# Patient Record
Sex: MALE | Race: WHITE | NOT HISPANIC OR LATINO | Employment: FULL TIME | URBAN - METROPOLITAN AREA
[De-identification: names, ages, dates, MRNs, and addresses within clinical notes are randomized per-mention and may not be internally consistent; named-entity substitution may affect disease eponyms.]

---

## 2017-03-23 DIAGNOSIS — E11.9 TYPE 2 DIABETES MELLITUS WITHOUT COMPLICATION: ICD-10-CM

## 2017-03-23 DIAGNOSIS — I10 UNSPECIFIED ESSENTIAL HYPERTENSION: Primary | ICD-10-CM

## 2017-03-23 RX ORDER — SAXAGLIPTIN 5 MG/1
TABLET, FILM COATED ORAL
Qty: 30 TABLET | Refills: 6 | Status: SHIPPED | OUTPATIENT
Start: 2017-03-23

## 2017-03-23 RX ORDER — LISINOPRIL 40 MG/1
TABLET ORAL
Qty: 30 TABLET | Refills: 6 | Status: SHIPPED | OUTPATIENT
Start: 2017-03-23

## 2017-03-23 RX ORDER — METFORMIN HYDROCHLORIDE 1000 MG/1
TABLET ORAL
Qty: 120 TABLET | Refills: 6 | Status: SHIPPED | OUTPATIENT
Start: 2017-03-23

## 2017-03-23 RX ORDER — GLIPIZIDE 10 MG/1
TABLET ORAL
Qty: 120 TABLET | Refills: 6 | Status: SHIPPED | OUTPATIENT
Start: 2017-03-23

## 2017-03-23 RX ORDER — CITALOPRAM 40 MG/1
TABLET, FILM COATED ORAL
Qty: 30 TABLET | Refills: 6 | Status: SHIPPED | OUTPATIENT
Start: 2017-03-23

## 2017-03-27 ENCOUNTER — PATIENT MESSAGE (OUTPATIENT)
Dept: PULMONOLOGY | Facility: CLINIC | Age: 57
End: 2017-03-27

## 2017-03-27 RX ORDER — FLUTICASONE PROPIONATE 50 MCG
2 SPRAY, SUSPENSION (ML) NASAL DAILY
Qty: 16 G | Refills: 6 | Status: SHIPPED | OUTPATIENT
Start: 2017-03-27 | End: 2017-04-26

## 2017-03-27 RX ORDER — FLUTICASONE PROPIONATE 44 UG/1
2 AEROSOL, METERED RESPIRATORY (INHALATION) 2 TIMES DAILY
Qty: 10.6 G | Refills: 3 | Status: SHIPPED | OUTPATIENT
Start: 2017-03-27 | End: 2018-03-27

## 2017-03-27 RX ORDER — ALBUTEROL SULFATE 90 UG/1
1-2 AEROSOL, METERED RESPIRATORY (INHALATION) EVERY 6 HOURS PRN
Qty: 18 G | Refills: 3 | Status: SHIPPED | OUTPATIENT
Start: 2017-03-27 | End: 2018-03-27

## 2017-08-09 ENCOUNTER — TELEPHONE (OUTPATIENT)
Dept: PULMONOLOGY | Facility: CLINIC | Age: 57
End: 2017-08-09

## 2017-08-09 NOTE — TELEPHONE ENCOUNTER
Called all numbers in chart. Unable to reach at any number. Some are d/c'd or he is no longer at the work number listed.

## 2017-10-25 DIAGNOSIS — E11.9 TYPE 2 DIABETES MELLITUS WITHOUT COMPLICATION: ICD-10-CM

## 2017-10-26 RX ORDER — GLIPIZIDE 10 MG/1
TABLET ORAL
Qty: 120 TABLET | Refills: 5 | OUTPATIENT
Start: 2017-10-26

## 2017-10-26 RX ORDER — LISINOPRIL 40 MG/1
TABLET ORAL
Qty: 30 TABLET | Refills: 5 | OUTPATIENT
Start: 2017-10-26

## 2017-10-26 RX ORDER — CITALOPRAM 40 MG/1
TABLET, FILM COATED ORAL
Qty: 30 TABLET | Refills: 5 | OUTPATIENT
Start: 2017-10-26

## 2017-10-29 DIAGNOSIS — E11.9 TYPE 2 DIABETES MELLITUS WITHOUT COMPLICATION: ICD-10-CM

## 2017-10-30 RX ORDER — CITALOPRAM 40 MG/1
TABLET, FILM COATED ORAL
Qty: 30 TABLET | Refills: 5 | OUTPATIENT
Start: 2017-10-30

## 2017-10-30 RX ORDER — LISINOPRIL 40 MG/1
TABLET ORAL
Qty: 30 TABLET | Refills: 5 | OUTPATIENT
Start: 2017-10-30

## 2017-10-30 RX ORDER — GLIPIZIDE 10 MG/1
TABLET ORAL
Qty: 120 TABLET | Refills: 5 | OUTPATIENT
Start: 2017-10-30

## 2017-12-13 ENCOUNTER — CLINICAL SUPPORT (OUTPATIENT)
Dept: REHABILITATION | Facility: HOSPITAL | Age: 57
End: 2017-12-13
Payer: MEDICAID

## 2017-12-13 DIAGNOSIS — M25.611 DECREASED ROM OF RIGHT SHOULDER: ICD-10-CM

## 2017-12-13 DIAGNOSIS — G89.29 CHRONIC RIGHT SHOULDER PAIN: ICD-10-CM

## 2017-12-13 DIAGNOSIS — M25.511 CHRONIC RIGHT SHOULDER PAIN: ICD-10-CM

## 2017-12-13 PROCEDURE — 97110 THERAPEUTIC EXERCISES: CPT | Mod: PO

## 2017-12-13 PROCEDURE — 97165 OT EVAL LOW COMPLEX 30 MIN: CPT | Mod: PO

## 2017-12-13 NOTE — PATIENT INSTRUCTIONS
Pendulum Circular        Bend forward 90° at waist, leaning on table for support. Rock body in a circular pattern to move arm clockwise 15 times then counterclockwise 15 times.  Do 2 sessions per day.    Copyright © [facility]. All rights reserved.   Pendulum Forward/Back        Bend forward 90º at waist, using table for support. Rock body forward and back to swing arm.  Repeat _15_ times. Do _2_ sessions per day.    Copyright © [facility]. All rights reserved.   Pendulum Side to Side        Bend forward 90º at waist, leaning on table for support. Rock body from side to side and let arm swing freely.  Repeat _15_ times. Do _2_ sessions per day.    Copyright © I. All rights reserved.   Elevation (Active)        Shrug shoulders up, breathing in. Relax, breathing out.  Repeat _15_ times. Do _2_ sessions per day.    Copyright © I. All rights reserved.     Scapular Retraction: Elbow Flexion (Standing)        With elbows bent to 90°, pinch shoulder blades together and rotate arms out, keeping elbows bent.  Repeat _10_ times per set. Do _1_ sets per session. Do _2_ sessions per day.     https://orth.varinode.us/948     Copyright © I. All rights reserved.
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
## 2017-12-19 ENCOUNTER — CLINICAL SUPPORT (OUTPATIENT)
Dept: REHABILITATION | Facility: HOSPITAL | Age: 57
End: 2017-12-19
Payer: MEDICAID

## 2017-12-19 DIAGNOSIS — M25.511 CHRONIC RIGHT SHOULDER PAIN: ICD-10-CM

## 2017-12-19 DIAGNOSIS — M25.611 DECREASED ROM OF RIGHT SHOULDER: ICD-10-CM

## 2017-12-19 DIAGNOSIS — G89.29 CHRONIC RIGHT SHOULDER PAIN: ICD-10-CM

## 2017-12-19 PROCEDURE — 97530 THERAPEUTIC ACTIVITIES: CPT | Mod: PO

## 2017-12-19 NOTE — PROGRESS NOTES
"TIME RECORD    Date: 12/19/2017      Start Time:  8:05  Stop Time:  8:55  Visit: 2    PROCEDURES:    TIMED  Procedure Min.   TA 35                     UNTIMED  Procedure Min.   MHP 5   IFC 10     Total Timed Minutes:  35  Total Timed Units:  2  Total Untimed Units:  1  Charges Billed/# of units:  2TA      Progress/Current Status    Subjective:     Patient ID: Glen Ordaz is a 57 y.o. male.  Diagnosis:   1. Chronic right shoulder pain     2. Decreased ROM of right shoulder       Pain: 4 /10  Pt states "I tried to do some exercise but some of them were hurting me."    Objective:     Pt seen by OT this session. Treatment consisted of the following:    Patient received MH x 5 min to R shoulder to increase blood flow, circulation and tissue elasticity prior to therex   Date: 12/19/17    Visit: 2   Pendulums  10 reps each   Shoulder rotations 10 reps   Scapular retractions 10 reps   Pulleys  15 reps, with 5 sec hold   Table slides 10 reps each, front/back, side/side   Shoulder isometrics, as tolerated 5 reps each with 5 sec hold (5 ways, not ER today)             Patient receives IFC electrical stimulation for pain control applied to R shoulder,   frequency = 13,  @ 100% scan,  for 10 minutes with cold pack         Assessment:     Pt tolerated treatment fairly today. Pt with good participation  And motivation, but cont to c/o high pain with R shoulder movement.  Pt c/o increased pain with table slides at end range and with some isometrics, especially with IR.  Ex performed as tolerated. Pt cont to present with R shld pain, UE weakness, decreased ROM, and limited functional use of R UE.  Pt would cont to benefit from skilled OT services to improve deficits for maximized functional use of R UE.    Patient Education/Response:     Cont HEP and modalities as tolerated    Plans and Goals:     Cont OT poc 2x/week for 8 weeks during certification period 12/13/17 to 2/13/18 in pursuit of established goals    Goals to be met " in 4 weeks: (1/13/18)  1) Initiate Hep   2) Pt will increase R shoulder AROM by 10 degrees grossly for improved performance with overhead ADL's  3) Pt will report 4/10 pain in (R)shoulder at worst  4) Pt will demonstrate increased MMT by at least 1/2 grade for increased functional use of the R UE     Goals to be met by discharge:  1) Independent with HEP  2) Pt will demonstrate (R) shoulder AROM WFL grossly for [place] with ADL's  3) Pt will demonstrate (R) shoulder MMT WFL grossly for [place] with functional activities  4) Independent and pain free with ADL's and IADL's  5) Patient will be able to achieve less than or equal to 40% on FOTO shoulder survey demonstrating overall improved functional ability with upper extremity.      RENALDO Hammond

## 2017-12-21 ENCOUNTER — CLINICAL SUPPORT (OUTPATIENT)
Dept: REHABILITATION | Facility: HOSPITAL | Age: 57
End: 2017-12-21
Payer: MEDICAID

## 2017-12-21 DIAGNOSIS — M25.511 CHRONIC RIGHT SHOULDER PAIN: ICD-10-CM

## 2017-12-21 DIAGNOSIS — G89.29 CHRONIC RIGHT SHOULDER PAIN: ICD-10-CM

## 2017-12-21 DIAGNOSIS — M25.611 DECREASED ROM OF RIGHT SHOULDER: ICD-10-CM

## 2017-12-21 PROCEDURE — 97530 THERAPEUTIC ACTIVITIES: CPT | Mod: PO

## 2017-12-21 NOTE — PROGRESS NOTES
"TIME RECORD    Date: 12/21/2017      Start Time:  8:05  Stop Time:  9:00  Visit: 3    PROCEDURES:    TIMED  Procedure Min.   TA 45   IFC during portion of TA (20)                 UNTIMED  Procedure Min.   MHP 5   CP 5     Total Timed Minutes:  45  Total Timed Units:  3  Total Untimed Units:  0  Charges Billed/# of units:  3TA      Progress/Current Status    Subjective:     Patient ID: Glen Ordaz is a 57 y.o. male.  Diagnosis:   1. Chronic right shoulder pain     2. Decreased ROM of right shoulder       Pain: 4 /10  Pt states "I have some days when I feel ok, and then other days I can tell I did something to bother it."    Objective:     Pt cont to appear guarded with R shld, but is willing to work with it  In therapy.    Pt seen by OT this session. Treatment consisted of the following:    Patient received MH x 5 min to R shoulder to increase blood flow, circulation and tissue elasticity prior to therex  Patient receives IFC electrical stimulation for pain control applied to R shoulder,   frequency = 18,  @ 100% scan,  for 20 min during ex and manual therapy     Date: 12/21/17    Visit: 3   Pendulums  10 reps each   Shoulder rotations 10 reps   Scapular retractions 10 reps   Pulleys  3 min   Table slides 10 reps each, front/back, side/side (not today)   Shoulder isometrics, as tolerated 5 reps each with 5 sec hold (not today)   PROM to R shld as tolerated 10 reps each (with IFC)   Chest press with dowel, 0# 5 reps (with IFC)   Prone scapular retraction 8 reps (with IFC)   Prone ext 5 reps (with IFC)              Patient received cold pack x 5 minutes to decrease pain/inflammation and edema following treatment session.       Assessment:     Pt tolerated treatment fairly well today. Pt with good participation  And motivation, but cont to c/o high pain with R shoulder movement.  Improved tolerance of treatment today, especially with IFC.  Pt cont to c/o pain with FF and ABD.  Ex performed as tolerated. Pt cont to " present with R shld pain, UE weakness, decreased ROM, and limited functional use of R UE.  Pt progressing fairly towards goals. Pt would cont to benefit from skilled OT services to improve deficits for maximized functional use of R UE.    Patient Education/Response:     Cont HEP and modalities as tolerated    Plans and Goals:     Cont OT poc 2x/week for 8 weeks during certification period 12/13/17 to 2/13/18 in pursuit of established goals    Goals to be met in 4 weeks: (1/13/18)  1) Initiate Hep   2) Pt will increase R shoulder AROM by 10 degrees grossly for improved performance with overhead ADL's  3) Pt will report 4/10 pain in (R)shoulder at worst  4) Pt will demonstrate increased MMT by at least 1/2 grade for increased functional use of the R UE     Goals to be met by discharge:  1) Independent with HEP  2) Pt will demonstrate (R) shoulder AROM WFL grossly for [place] with ADL's  3) Pt will demonstrate (R) shoulder MMT WFL grossly for [place] with functional activities  4) Independent and pain free with ADL's and IADL's  5) Patient will be able to achieve less than or equal to 40% on FOTO shoulder survey demonstrating overall improved functional ability with upper extremity.      RENALDO Hammond

## 2025-03-07 ENCOUNTER — TELEPHONE (OUTPATIENT)
Dept: FAMILY MEDICINE | Facility: CLINIC | Age: 65
End: 2025-03-07
Payer: MEDICARE
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
## 2025-03-21 ENCOUNTER — TELEPHONE (OUTPATIENT)
Dept: FAMILY MEDICINE | Facility: CLINIC | Age: 65
End: 2025-03-21
Payer: MEDICARE

## 2025-03-31 ENCOUNTER — OFFICE VISIT (OUTPATIENT)
Dept: FAMILY MEDICINE | Facility: CLINIC | Age: 65
End: 2025-03-31
Payer: MEDICARE

## 2025-03-31 VITALS
RESPIRATION RATE: 18 BRPM | HEART RATE: 89 BPM | OXYGEN SATURATION: 98 % | TEMPERATURE: 98 F | SYSTOLIC BLOOD PRESSURE: 139 MMHG | DIASTOLIC BLOOD PRESSURE: 85 MMHG | BODY MASS INDEX: 29 KG/M2 | WEIGHT: 195.81 LBS | HEIGHT: 69 IN

## 2025-03-31 DIAGNOSIS — I10 PRIMARY HYPERTENSION: ICD-10-CM

## 2025-03-31 DIAGNOSIS — G89.29 CHRONIC RIGHT SHOULDER PAIN: ICD-10-CM

## 2025-03-31 DIAGNOSIS — Z76.89 ENCOUNTER TO ESTABLISH CARE: ICD-10-CM

## 2025-03-31 DIAGNOSIS — T14.8XXA ABRASION: ICD-10-CM

## 2025-03-31 DIAGNOSIS — F41.9 ANXIETY: ICD-10-CM

## 2025-03-31 DIAGNOSIS — Z13.6 ENCOUNTER FOR ABDOMINAL AORTIC ANEURYSM (AAA) SCREENING: ICD-10-CM

## 2025-03-31 DIAGNOSIS — F33.0 MDD (MAJOR DEPRESSIVE DISORDER), RECURRENT EPISODE, MILD: ICD-10-CM

## 2025-03-31 DIAGNOSIS — Z11.4 SCREENING FOR HIV (HUMAN IMMUNODEFICIENCY VIRUS): ICD-10-CM

## 2025-03-31 DIAGNOSIS — J41.0 SIMPLE CHRONIC BRONCHITIS: ICD-10-CM

## 2025-03-31 DIAGNOSIS — Z11.59 NEED FOR HEPATITIS C SCREENING TEST: Primary | ICD-10-CM

## 2025-03-31 DIAGNOSIS — E11.9 TYPE 2 DIABETES MELLITUS WITHOUT COMPLICATION, WITHOUT LONG-TERM CURRENT USE OF INSULIN: ICD-10-CM

## 2025-03-31 DIAGNOSIS — M25.511 CHRONIC RIGHT SHOULDER PAIN: ICD-10-CM

## 2025-03-31 DIAGNOSIS — S50.811D ABRASION OF RIGHT FOREARM, SUBSEQUENT ENCOUNTER: ICD-10-CM

## 2025-03-31 PROBLEM — F32.A DEPRESSIVE DISORDER: Status: ACTIVE | Noted: 2017-11-09

## 2025-03-31 PROCEDURE — 99999 PR PBB SHADOW E&M-EST. PATIENT-LVL V: CPT | Mod: PBBFAC,,, | Performed by: STUDENT IN AN ORGANIZED HEALTH CARE EDUCATION/TRAINING PROGRAM

## 2025-03-31 RX ORDER — ATORVASTATIN CALCIUM 10 MG/1
10 TABLET, FILM COATED ORAL DAILY
Start: 2025-03-31

## 2025-03-31 RX ORDER — HYDROCODONE BITARTRATE AND ACETAMINOPHEN 5; 325 MG/1; MG/1
2 TABLET ORAL EVERY 6 HOURS PRN
COMMUNITY
Start: 2025-03-07 | End: 2025-03-31

## 2025-03-31 RX ORDER — BUSPIRONE HYDROCHLORIDE 15 MG/1
15 TABLET ORAL 3 TIMES DAILY
COMMUNITY
Start: 2025-03-11 | End: 2025-04-03 | Stop reason: SDUPTHER

## 2025-03-31 RX ORDER — HYDROCODONE BITARTRATE AND ACETAMINOPHEN 10; 325 MG/1; MG/1
1 TABLET ORAL EVERY 12 HOURS PRN
Qty: 60 TABLET | Refills: 0 | Status: SHIPPED | OUTPATIENT
Start: 2025-04-28

## 2025-03-31 RX ORDER — ALPRAZOLAM 2 MG/1
2 TABLET ORAL NIGHTLY PRN
Qty: 30 TABLET | Refills: 5 | Status: SHIPPED | OUTPATIENT
Start: 2025-03-31

## 2025-03-31 RX ORDER — FOLIC ACID 1 MG/1
TABLET ORAL
COMMUNITY

## 2025-03-31 RX ORDER — TAMSULOSIN HYDROCHLORIDE 0.4 MG/1
1 CAPSULE ORAL NIGHTLY
COMMUNITY
Start: 2025-03-11 | End: 2025-03-31

## 2025-03-31 RX ORDER — INSULIN DEGLUDEC 100 U/ML
20-40 INJECTION, SOLUTION SUBCUTANEOUS NIGHTLY
Start: 2025-03-31 | End: 2026-03-31

## 2025-03-31 RX ORDER — HYDROCODONE BITARTRATE AND ACETAMINOPHEN 10; 325 MG/1; MG/1
1 TABLET ORAL EVERY 12 HOURS PRN
Qty: 60 TABLET | Refills: 0 | Status: SHIPPED | OUTPATIENT
Start: 2025-05-26

## 2025-03-31 RX ORDER — METFORMIN HYDROCHLORIDE 1000 MG/1
1000 TABLET ORAL 2 TIMES DAILY WITH MEALS
Start: 2025-03-31

## 2025-03-31 RX ORDER — HYDROCODONE BITARTRATE AND ACETAMINOPHEN 10; 325 MG/1; MG/1
1 TABLET ORAL EVERY 12 HOURS PRN
Qty: 60 TABLET | Refills: 0 | Status: SHIPPED | OUTPATIENT
Start: 2025-03-31

## 2025-03-31 RX ORDER — DULAGLUTIDE 1.5 MG/.5ML
1.5 INJECTION, SOLUTION SUBCUTANEOUS
COMMUNITY
Start: 2025-03-18

## 2025-03-31 RX ORDER — ATORVASTATIN CALCIUM 10 MG/1
10 TABLET, FILM COATED ORAL
COMMUNITY
Start: 2025-03-11 | End: 2025-03-31 | Stop reason: SDUPTHER

## 2025-03-31 NOTE — PATIENT INSTRUCTIONS
Dominick Carroll,     If you are due for any health screening(s) below please notify me so we can arrange them to be ordered and scheduled. Most healthy patients at your age complete them, but you are free to accept or refuse.     If you can't do it, I'll definitely understand. If you can, I'd certainly appreciate it!    Tests to Keep You Healthy    Colon Cancer Screening: Met on 2/15/2016  Last Blood Pressure <= 139/89 ( ): NO

## 2025-04-03 ENCOUNTER — HOSPITAL ENCOUNTER (OUTPATIENT)
Dept: RADIOLOGY | Facility: HOSPITAL | Age: 65
Discharge: HOME | End: 2025-04-03
Attending: STUDENT IN AN ORGANIZED HEALTH CARE EDUCATION/TRAINING PROGRAM
Payer: MEDICARE

## 2025-04-03 DIAGNOSIS — F41.9 ANXIETY: Primary | ICD-10-CM

## 2025-04-03 DIAGNOSIS — F33.0 MDD (MAJOR DEPRESSIVE DISORDER), RECURRENT EPISODE, MILD: ICD-10-CM

## 2025-04-03 DIAGNOSIS — Z13.6 ENCOUNTER FOR ABDOMINAL AORTIC ANEURYSM (AAA) SCREENING: ICD-10-CM

## 2025-04-03 PROCEDURE — 76775 US EXAM ABDO BACK WALL LIM: CPT | Mod: TC,PN

## 2025-04-03 PROCEDURE — 76775 US EXAM ABDO BACK WALL LIM: CPT | Mod: 26,,, | Performed by: STUDENT IN AN ORGANIZED HEALTH CARE EDUCATION/TRAINING PROGRAM

## 2025-04-03 RX ORDER — BUSPIRONE HYDROCHLORIDE 15 MG/1
15 TABLET ORAL 3 TIMES DAILY
Qty: 270 TABLET | Refills: 3 | Status: SHIPPED | OUTPATIENT
Start: 2025-04-03

## 2025-04-03 NOTE — TELEPHONE ENCOUNTER
Care Due:                  Date            Visit Type   Department     Provider  --------------------------------------------------------------------------------                                NP -                              PRIMARY      [facility]  Last Visit: 03-      CARE ([facility])   MEDICINE       Marycarmen B Royal                              EP -                              [facility]  Next Visit: 07-      CARE ([facility])   [facility]  Test          Frequency    Reason                     Performed    Due Date  --------------------------------------------------------------------------------    CMP.........  12 months..  atorvastatin,              Not Found    Overdue                             empagliflozin, insulin,                             metFORMIN................    HBA1C.......  6 months...  empagliflozin, insulin,    03- 09-                             metFORMIN................    Lipid Panel.  12 months..  atorvastatin.............  10-   10-    [facility] Embedded Care Due Messages. Reference number: [id number].   4/03/2025 1:29:02 PM CDT

## 2025-04-03 NOTE — TELEPHONE ENCOUNTER
Refill Routing Note   Medication(s) are not appropriate for processing by [facility] for the following reason(s):        Outside of protocol    [facility] action(s):  Route      Medication Therapy Plan: FLOS      Appointments  past 12m or future 3m with PCP    Date Provider   Last Visit   3/31/2025 Marycarmen Sterling MD   Next Visit   7/2/2025 Marycarmen Sterling MD   ED visits in past 90 days: 0        Note composed:1:32 PM 04/03/2025

## 2025-04-03 NOTE — TELEPHONE ENCOUNTER
----- Message from Mervat sent at 4/3/2025  1:24 PM CDT -----  Contact: Glen  .Type:  RX Refill RequestWho Called:  Glen Refill or New Rx: Refill RX Name and Strength:busPIRone (BUSPAR) 15 MG tablet How is the patient currently taking it? (ex. 1XDay):3 times daily Is this a 30 day or 90 day RX: 90 Preferred Pharmacy with phone number: .[facility]/pharmacy #[id number] - [address] [address]: [phone] Fax: [phone] or Mail Order: local Ordering Provider: Old pcp Would the patient rather a call back or a response via [facility]?  Call Best Call Back Number: .[phone] Information:

## 2025-04-07 ENCOUNTER — TELEPHONE (OUTPATIENT)
Dept: FAMILY MEDICINE | Facility: CLINIC | Age: 65
End: 2025-04-07
Payer: MEDICARE

## 2025-04-07 ENCOUNTER — PATIENT MESSAGE (OUTPATIENT)
Dept: FAMILY MEDICINE | Facility: CLINIC | Age: 65
End: 2025-04-07
Payer: MEDICARE

## 2025-04-07 DIAGNOSIS — G89.29 CHRONIC RIGHT SHOULDER PAIN: Primary | ICD-10-CM

## 2025-04-07 DIAGNOSIS — M54.2 CERVICALGIA: ICD-10-CM

## 2025-04-07 DIAGNOSIS — M25.511 CHRONIC RIGHT SHOULDER PAIN: Primary | ICD-10-CM

## 2025-04-07 NOTE — TELEPHONE ENCOUNTER
----- Message from Lucy sent at 4/7/2025  8:51 AM CDT -----  .Name of Caller:.Glen Ordaz Best Call Back Number:.[phone]  Additional Information: Patient called to confirm the provider looked at his most recent medical records since 2018 from [facility]. Call the patient back to advise. Alfredo DONOVAN

## 2025-04-07 NOTE — TELEPHONE ENCOUNTER
Orders Placed This Encounter   Procedures    Ambulatory Referral/Consult to Physical Therapy     Standing Status:   Future     Expected Date:   4/14/2025     Expiration Date:   5/7/2026     Referral Priority:   Routine     Referral Type:   Physical Therapy     Referral Reason:   Specialty Services Required     Requested Specialty:   Physical Therapy     Number of Visits Requested:   1

## 2025-04-14 ENCOUNTER — TELEPHONE (OUTPATIENT)
Dept: FAMILY MEDICINE | Facility: CLINIC | Age: 65
End: 2025-04-14
Payer: MEDICARE

## 2025-04-14 NOTE — TELEPHONE ENCOUNTER
----- Message from Hortensia sent at 4/14/2025 10:04 AM CDT -----  Regarding: Medical Assistance  Contact: Patient  Type:  Needs Medical AdviceWho Called: Patient Symptoms (please be specific): n/a  How long has patient had these symptoms:  n/a Pharmacy name and phone #:  n/a Would the patient rather a call back or a response via [facility]? Call back Best Call Back Number:   [phone] Information: Patient is requesting a call back in reference to medical release form signed 03/24/2025 Patient stated he would like an update if records have been received from [place] [facility] Patient has an upcoming appt scheduled with physician and would like to ensure she has records prior to appt Please Assist

## 2025-04-17 ENCOUNTER — PATIENT MESSAGE (OUTPATIENT)
Dept: FAMILY MEDICINE | Facility: CLINIC | Age: 65
End: 2025-04-17
Payer: MEDICARE

## 2025-04-17 RX ORDER — DULAGLUTIDE 1.5 MG/.5ML
1.5 INJECTION, SOLUTION SUBCUTANEOUS
Qty: 12 PEN | Refills: 1 | Status: SHIPPED | OUTPATIENT
Start: 2025-04-17

## 2025-04-17 NOTE — TELEPHONE ENCOUNTER
No care due was identified.  [facility] Embedded Care Due Messages. Reference number: [id number].   4/17/2025 8:24:14 AM CDT

## 2025-04-28 DIAGNOSIS — M25.511 CHRONIC RIGHT SHOULDER PAIN: ICD-10-CM

## 2025-04-28 DIAGNOSIS — G89.29 CHRONIC RIGHT SHOULDER PAIN: ICD-10-CM

## 2025-04-28 NOTE — TELEPHONE ENCOUNTER
No care due was identified.  [facility] Embedded Care Due Messages. Reference number: [id number].   4/28/2025 6:00:10 PM CDT

## 2025-04-29 RX ORDER — HYDROCODONE BITARTRATE AND ACETAMINOPHEN 10; 325 MG/1; MG/1
1 TABLET ORAL EVERY 12 HOURS PRN
Qty: 60 TABLET | Refills: 0 | Status: SHIPPED | OUTPATIENT
Start: 2025-04-29

## 2025-04-29 NOTE — TELEPHONE ENCOUNTER
Refill Routing Note   Medication(s) are not appropriate for processing by [facility] for the following reason(s):        Outside of protocol    [facility] action(s):  Route        Medication Therapy Plan: Pt taking opioid and benzo (Xanax)      Appointments  past 12m or future 3m with PCP    Date Provider   Last Visit   3/31/2025 Marycarmen Sterling MD   Next Visit   7/2/2025 Marycarmen Sterling MD   ED visits in past 90 days: 0        Note composed:7:20 AM 04/29/2025

## 2025-05-05 ENCOUNTER — PATIENT MESSAGE (OUTPATIENT)
Dept: FAMILY MEDICINE | Facility: CLINIC | Age: 65
End: 2025-05-05

## 2025-05-05 ENCOUNTER — OFFICE VISIT (OUTPATIENT)
Dept: FAMILY MEDICINE | Facility: CLINIC | Age: 65
End: 2025-05-05
Payer: MEDICARE

## 2025-05-05 VITALS
WEIGHT: 180 LBS | DIASTOLIC BLOOD PRESSURE: 73 MMHG | HEIGHT: 69 IN | BODY MASS INDEX: 26.66 KG/M2 | SYSTOLIC BLOOD PRESSURE: 116 MMHG

## 2025-05-05 DIAGNOSIS — M79.622 LEFT UPPER ARM PAIN: Primary | ICD-10-CM

## 2025-05-05 PROCEDURE — 3288F FALL RISK ASSESSMENT DOCD: CPT | Mod: CPTII,95,, | Performed by: STUDENT IN AN ORGANIZED HEALTH CARE EDUCATION/TRAINING PROGRAM

## 2025-05-05 PROCEDURE — 3078F DIAST BP <80 MM HG: CPT | Mod: CPTII,95,, | Performed by: STUDENT IN AN ORGANIZED HEALTH CARE EDUCATION/TRAINING PROGRAM

## 2025-05-05 PROCEDURE — 1160F RVW MEDS BY RX/DR IN RCRD: CPT | Mod: CPTII,95,, | Performed by: STUDENT IN AN ORGANIZED HEALTH CARE EDUCATION/TRAINING PROGRAM

## 2025-05-05 PROCEDURE — 1159F MED LIST DOCD IN RCRD: CPT | Mod: CPTII,95,, | Performed by: STUDENT IN AN ORGANIZED HEALTH CARE EDUCATION/TRAINING PROGRAM

## 2025-05-05 PROCEDURE — 3074F SYST BP LT 130 MM HG: CPT | Mod: CPTII,95,, | Performed by: STUDENT IN AN ORGANIZED HEALTH CARE EDUCATION/TRAINING PROGRAM

## 2025-05-05 PROCEDURE — 98006 SYNCH AUDIO-VIDEO EST MOD 30: CPT | Mod: 95,,, | Performed by: STUDENT IN AN ORGANIZED HEALTH CARE EDUCATION/TRAINING PROGRAM

## 2025-05-05 PROCEDURE — 1101F PT FALLS ASSESS-DOCD LE1/YR: CPT | Mod: CPTII,95,, | Performed by: STUDENT IN AN ORGANIZED HEALTH CARE EDUCATION/TRAINING PROGRAM

## 2025-05-05 PROCEDURE — 4010F ACE/ARB THERAPY RXD/TAKEN: CPT | Mod: CPTII,95,, | Performed by: STUDENT IN AN ORGANIZED HEALTH CARE EDUCATION/TRAINING PROGRAM

## 2025-05-05 PROCEDURE — 1125F AMNT PAIN NOTED PAIN PRSNT: CPT | Mod: CPTII,95,, | Performed by: STUDENT IN AN ORGANIZED HEALTH CARE EDUCATION/TRAINING PROGRAM

## 2025-05-05 PROCEDURE — G2211 COMPLEX E/M VISIT ADD ON: HCPCS | Mod: 95,,, | Performed by: STUDENT IN AN ORGANIZED HEALTH CARE EDUCATION/TRAINING PROGRAM
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
## 2025-05-14 ENCOUNTER — PATIENT MESSAGE (OUTPATIENT)
Dept: FAMILY MEDICINE | Facility: CLINIC | Age: 65
End: 2025-05-14
Payer: MEDICARE

## 2025-05-14 DIAGNOSIS — E11.9 TYPE 2 DIABETES MELLITUS WITHOUT COMPLICATION, WITHOUT LONG-TERM CURRENT USE OF INSULIN: ICD-10-CM

## 2025-05-14 RX ORDER — INSULIN DEGLUDEC 100 U/ML
20-40 INJECTION, SOLUTION SUBCUTANEOUS NIGHTLY
Qty: 12 ML | Refills: 11 | Status: SHIPPED | OUTPATIENT
Start: 2025-05-14 | End: 2026-05-14

## 2025-05-14 NOTE — TELEPHONE ENCOUNTER
No orders of the defined types were placed in this encounter.      Medications Ordered This Encounter   Medications    insulin degludec (TRESIBA FLEXTOUCH U-100) 100 unit/mL (3 mL) insulin pen     Sig: Inject 20-40 Units into the skin every evening.     Dispense:  12 mL     Refill:  11

## 2025-05-21 ENCOUNTER — PATIENT MESSAGE (OUTPATIENT)
Dept: FAMILY MEDICINE | Facility: CLINIC | Age: 65
End: 2025-05-21
Payer: MEDICARE